# Patient Record
Sex: FEMALE | Race: WHITE | Employment: OTHER | URBAN - METROPOLITAN AREA
[De-identification: names, ages, dates, MRNs, and addresses within clinical notes are randomized per-mention and may not be internally consistent; named-entity substitution may affect disease eponyms.]

---

## 2017-01-20 ENCOUNTER — OFFICE VISIT (OUTPATIENT)
Dept: OBGYN CLINIC | Facility: CLINIC | Age: 27
End: 2017-01-20

## 2017-01-20 VITALS
HEIGHT: 69 IN | DIASTOLIC BLOOD PRESSURE: 62 MMHG | BODY MASS INDEX: 20.76 KG/M2 | SYSTOLIC BLOOD PRESSURE: 100 MMHG | WEIGHT: 140.19 LBS

## 2017-01-20 DIAGNOSIS — G43.709 CHRONIC MIGRAINE WITHOUT AURA WITHOUT STATUS MIGRAINOSUS, NOT INTRACTABLE: Primary | ICD-10-CM

## 2017-01-20 DIAGNOSIS — Z11.3 SCREENING EXAMINATION FOR VENEREAL DISEASE: ICD-10-CM

## 2017-01-20 DIAGNOSIS — Z12.4 ROUTINE CERVICAL SMEAR: ICD-10-CM

## 2017-01-20 DIAGNOSIS — Z01.419 ENCOUNTER FOR GYNECOLOGICAL EXAMINATION WITHOUT ABNORMAL FINDING: ICD-10-CM

## 2017-01-20 PROBLEM — R10.2 PELVIC PAIN: Status: ACTIVE | Noted: 2017-01-20

## 2017-01-20 PROBLEM — N92.0 MENORRHAGIA WITH REGULAR CYCLE: Status: ACTIVE | Noted: 2017-01-20

## 2017-01-20 PROBLEM — N94.6 DYSMENORRHEA: Status: ACTIVE | Noted: 2017-01-20

## 2017-01-20 PROCEDURE — 99385 PREV VISIT NEW AGE 18-39: CPT | Performed by: OBSTETRICS & GYNECOLOGY

## 2017-01-20 PROCEDURE — 87491 CHLMYD TRACH DNA AMP PROBE: CPT | Performed by: OBSTETRICS & GYNECOLOGY

## 2017-01-20 PROCEDURE — 87591 N.GONORRHOEAE DNA AMP PROB: CPT | Performed by: OBSTETRICS & GYNECOLOGY

## 2017-01-20 PROCEDURE — 88175 CYTOPATH C/V AUTO FLUID REDO: CPT | Performed by: OBSTETRICS & GYNECOLOGY

## 2017-01-20 RX ORDER — RIZATRIPTAN BENZOATE 5 MG/1
5 TABLET, ORALLY DISINTEGRATING ORAL AS NEEDED
COMMUNITY
End: 2017-01-20

## 2017-01-20 RX ORDER — RIZATRIPTAN BENZOATE 5 MG/1
5 TABLET, ORALLY DISINTEGRATING ORAL AS NEEDED
Qty: 30 TABLET | Refills: 3 | Status: SHIPPED | OUTPATIENT
Start: 2017-01-20 | End: 2018-02-26

## 2017-01-20 NOTE — PROGRESS NOTES
GYN H&P     2017  9:35 AM    CC:Patient presents for annual    HPI: Patient is a 31year old  who presents with annual.    Patient is also c/o chronc bilateral pain x 3 M, described as a sharp pain, hurting for seconds, varying from 3- 5/10. Mckenzie Dietrich Topics   Smoking status: Never Smoker     Smokeless tobacco: Not on file    Alcohol Use: Yes  0.0 oz/week    0 Standard drinks or equivalent per week         Drug Use: No    Sexual Activity: Yes    Partners: Male    Birth Control/ Protection: Condom     Ot tender, no palpable masses, normal size  Anus:  No lesions or visible hemorrhoids      A/P: Patient is 31year old female here for well woman exam. Pap, GC/Chl sent. Pelvic pain:  I teo pt possibility of endometriosis. I teo pt treatment options.   Plan M

## 2017-01-23 LAB
C TRACH DNA SPEC QL NAA+PROBE: NEGATIVE
N GONORRHOEA DNA SPEC QL NAA+PROBE: NEGATIVE

## 2017-02-09 ENCOUNTER — OFFICE VISIT (OUTPATIENT)
Dept: OBGYN CLINIC | Facility: CLINIC | Age: 27
End: 2017-02-09

## 2017-02-09 VITALS
WEIGHT: 138 LBS | SYSTOLIC BLOOD PRESSURE: 120 MMHG | BODY MASS INDEX: 20.44 KG/M2 | HEIGHT: 69 IN | DIASTOLIC BLOOD PRESSURE: 80 MMHG

## 2017-02-09 DIAGNOSIS — Z30.430 ENCOUNTER FOR INSERTION OF INTRAUTERINE CONTRACEPTIVE DEVICE: Primary | ICD-10-CM

## 2017-02-09 PROCEDURE — 58300 INSERT INTRAUTERINE DEVICE: CPT | Performed by: OBSTETRICS & GYNECOLOGY

## 2017-02-10 NOTE — PROGRESS NOTES
IUD Insertion     Pregnancy Results: UCG negative  Birth control method(s) used: condoms  LMP:     Consent signed. Procedure discussed with the patient in detail including indication, risks, benefits, alternatives and complications.  I discussed with the p

## 2017-03-16 ENCOUNTER — TELEPHONE (OUTPATIENT)
Dept: OBGYN CLINIC | Facility: CLINIC | Age: 27
End: 2017-03-16

## 2017-03-16 NOTE — TELEPHONE ENCOUNTER
cant feel iud offered pt appts can not make it to any of the times thu offered does not want to wait till april

## 2017-03-16 NOTE — TELEPHONE ENCOUNTER
SPOKE WITH PATIENT REGARDING SCHEDULING APPT WITH DR Twyla Tejada  Wednesday ONLY FOR IUD CHECK PATIENT IS GETTING NERVOUS ABOUT IUD NOT BEING THERE OR MAYBE PUSHED UP MORE

## 2017-03-22 ENCOUNTER — OFFICE VISIT (OUTPATIENT)
Dept: OBGYN CLINIC | Facility: CLINIC | Age: 27
End: 2017-03-22

## 2017-03-22 VITALS
BODY MASS INDEX: 21.06 KG/M2 | HEIGHT: 69 IN | WEIGHT: 142.19 LBS | DIASTOLIC BLOOD PRESSURE: 80 MMHG | SYSTOLIC BLOOD PRESSURE: 120 MMHG

## 2017-03-22 DIAGNOSIS — R10.2 PELVIC PAIN: Primary | ICD-10-CM

## 2017-03-22 DIAGNOSIS — Z30.431 IUD CHECK UP: ICD-10-CM

## 2017-03-22 PROCEDURE — 99213 OFFICE O/P EST LOW 20 MIN: CPT | Performed by: OBSTETRICS & GYNECOLOGY

## 2017-03-22 RX ORDER — NAPROXEN SODIUM 550 MG/1
550 TABLET ORAL 2 TIMES DAILY WITH MEALS
Qty: 90 TABLET | Refills: 3 | Status: SHIPPED | OUTPATIENT
Start: 2017-03-22

## 2017-03-22 NOTE — PROGRESS NOTES
Francesca Duenas is a 31year old female. HPI:   Patient presents with: Other: Mirena IUD check  Abdominal Pain      Experiencing severe spasmodic uterine cramping and pain since IUD placed, unable to feel string.  Activity such as yoga and hiking have

## 2017-03-27 ENCOUNTER — NURSE ONLY (OUTPATIENT)
Dept: OBGYN CLINIC | Facility: CLINIC | Age: 27
End: 2017-03-27

## 2017-03-27 ENCOUNTER — OFFICE VISIT (OUTPATIENT)
Dept: OBGYN CLINIC | Facility: CLINIC | Age: 27
End: 2017-03-27

## 2017-03-27 VITALS
HEIGHT: 69 IN | SYSTOLIC BLOOD PRESSURE: 114 MMHG | BODY MASS INDEX: 20.66 KG/M2 | DIASTOLIC BLOOD PRESSURE: 72 MMHG | WEIGHT: 139.5 LBS

## 2017-03-27 VITALS — BODY MASS INDEX: 20.66 KG/M2 | WEIGHT: 139.5 LBS | HEIGHT: 69 IN

## 2017-03-27 DIAGNOSIS — N83.202 LEFT OVARIAN CYST: ICD-10-CM

## 2017-03-27 DIAGNOSIS — R10.2 PELVIC PAIN: Primary | ICD-10-CM

## 2017-03-27 DIAGNOSIS — Z30.431 IUD CHECK UP: ICD-10-CM

## 2017-03-27 DIAGNOSIS — R10.2 PELVIC PAIN: ICD-10-CM

## 2017-03-27 PROCEDURE — 76856 US EXAM PELVIC COMPLETE: CPT | Performed by: OBSTETRICS & GYNECOLOGY

## 2017-03-27 PROCEDURE — 99213 OFFICE O/P EST LOW 20 MIN: CPT | Performed by: OBSTETRICS & GYNECOLOGY

## 2017-03-27 NOTE — PROGRESS NOTES
Claudine Turner is a 31year old female. HPI:   Patient presents with:   Follow - Up: EST/ FU AFTER USN       USN today for pelvic pain since IUD placed = normal uterus with Mirena IUD in normal position + normal right ovary + 2.9 cm physiologic left ov

## 2018-02-26 DIAGNOSIS — G43.709 CHRONIC MIGRAINE WITHOUT AURA WITHOUT STATUS MIGRAINOSUS, NOT INTRACTABLE: ICD-10-CM

## 2018-02-27 RX ORDER — RIZATRIPTAN BENZOATE 5 MG/1
TABLET, ORALLY DISINTEGRATING ORAL
Qty: 30 TABLET | Refills: 0 | Status: SHIPPED | OUTPATIENT
Start: 2018-02-27

## (undated) NOTE — MR AVS SNAPSHOT
[address] at [address], [place] [address]  [phone]               Thank you for choosing us for your health care visit with Karyn Bhardwaj MD.  We are glad to serve you and happy to provide you wi Enter your [employer] Activation Code exactly as it appears below along with your Zip Code and Date of Birth to complete the sign-up process. If you do not sign up before the expiration date, you must request a new code.     Your unique [employer] Access Code: [email or web address]

## (undated) NOTE — MR AVS SNAPSHOT
[address] at [facility]  [address] [facility] [address] [phone]               Thank you for choosing us for your health care visit with Kamaljit Rahman MD.  We are glad to serv Today's Orders     ThinPrep PAP with HPV Reflex Request    Complete by:  As directed    Assoc Dx:  Encounter for gynecological examination without abnormal finding [Z01.419], Routine cervical smear [Z12.4]           Chlamydia/GC PCR Combo    Complete by:

## (undated) NOTE — Clinical Note
Evonne Escalante, :1990    CONSENT FOR PROCEDURE/SEDATION    1. I authorize the performance upon Evonne Escalante  the following: IUD Mirena    2.  I authorize Dr. Beena Hanna MD (and whomever is designated as the doctor’s assistant Witness: _________________________________________ Date:___________     Physician Signature: _______________________________ Date:___________

## (undated) NOTE — MR AVS SNAPSHOT
After Visit Summary   1/20/2017    Shadi Lipscomb    MRN: [id number]           Visit Information        Provider Department Dept Phone    1/20/2017  9:00 AM Geenva Islas MD [facility] [address]  [phone]      Your Vitals Were     BP In your Internet browser, go to [email or web address]    Click on the Activate your Account if you have an activation code in the box under the *New User? section. Enter your [employer] Activation Code exactly as it appears below.  You will will help us do so. For more information on [employer], please visit [email or web address]

## (undated) NOTE — MR AVS SNAPSHOT
[address] at [address], [facility] [address]  [phone]               Thank you for choosing us for your health care visit with Kamaljit Rahman MD.  We are glad to serve you and happy to kelton If you have questions, you can call [phone] to talk to our [facility] Staff. Remember, [employer] is NOT to be used for urgent needs. For medical emergencies, dial 911.            Visit [facility] online at  [email or web address]

## (undated) NOTE — MR AVS SNAPSHOT
[address] at [place] [address]  [phone]               Thank you for choosing us for your health care visit with Sandrine Boyd MD.  We are glad to serve you and happy to provide you wi Wednesday March 22, 2017     Imaging:  US PELVIS, FU EMG ONLY          Reason for Today's Visit     Other     Abdominal Pain           Medical Issues Discussed Today     IUD check up    Pelvic pain      Instructions and Information about Your Health     N Support Staff. Remember, MyChart is NOT to be used for urgent needs. For medical emergencies, dial 911.            Visit [facility] online at  [email or web address]